# Patient Record
Sex: MALE | Race: WHITE | NOT HISPANIC OR LATINO | Employment: OTHER | URBAN - METROPOLITAN AREA
[De-identification: names, ages, dates, MRNs, and addresses within clinical notes are randomized per-mention and may not be internally consistent; named-entity substitution may affect disease eponyms.]

---

## 2017-10-20 ENCOUNTER — OFFICE VISIT (OUTPATIENT)
Dept: CARDIOLOGY | Facility: CLINIC | Age: 62
End: 2017-10-20

## 2017-10-20 VITALS
WEIGHT: 233.6 LBS | HEIGHT: 74 IN | HEART RATE: 71 BPM | DIASTOLIC BLOOD PRESSURE: 71 MMHG | SYSTOLIC BLOOD PRESSURE: 125 MMHG | BODY MASS INDEX: 29.98 KG/M2

## 2017-10-20 DIAGNOSIS — D58.0 ANEMIA DUE TO HEREDITARY SPHEROCYTOSIS (HCC): ICD-10-CM

## 2017-10-20 DIAGNOSIS — I31.39 PERICARDIAL EFFUSION: Primary | ICD-10-CM

## 2017-10-20 DIAGNOSIS — E83.118 OTHER HEMOCHROMATOSIS: ICD-10-CM

## 2017-10-20 DIAGNOSIS — I10 CHRONIC HYPERTENSION: ICD-10-CM

## 2017-10-20 PROCEDURE — 93000 ELECTROCARDIOGRAM COMPLETE: CPT | Performed by: PHYSICIAN ASSISTANT

## 2017-10-20 PROCEDURE — 99214 OFFICE O/P EST MOD 30 MIN: CPT | Performed by: INTERNAL MEDICINE

## 2017-10-20 RX ORDER — LANOLIN ALCOHOL/MO/W.PET/CERES
400 CREAM (GRAM) TOPICAL DAILY
COMMUNITY

## 2017-10-20 RX ORDER — ERGOCALCIFEROL (VITAMIN D2) 10 MCG
400 TABLET ORAL DAILY
COMMUNITY

## 2017-10-20 RX ORDER — TAMSULOSIN HYDROCHLORIDE 0.4 MG/1
1 CAPSULE ORAL NIGHTLY
COMMUNITY

## 2017-10-20 RX ORDER — MULTIPLE VITAMINS W/ MINERALS TAB 9MG-400MCG
1 TAB ORAL DAILY
COMMUNITY

## 2017-10-20 RX ORDER — ALLOPURINOL 300 MG/1
300 TABLET ORAL DAILY
COMMUNITY

## 2017-10-20 NOTE — PROGRESS NOTES
Subjective:     Encounter Date:10/20/2017      Patient ID: Kingsley Wing is a 61 y.o.  white male, semi-retired , from [place], [place].    REFERRING PHYSICIAN:  Kingsley Barksdale MD  HEMATOLOGIST:  Susan Liddle, MD     Chief Complaint: Pericardial Effusion    PROBLEM LIST:  1.  Chronic asymptomatic pericardial effusion:  a. Abnormal CT  scan of the chest demonstrating pericardial effusion, November 2011.  b. GXT Cardiolite stress test demonstrating small, mild defect in the distribution of the distal LAD or RCA suggesting ischemia, which is small and probably associated with good cardiovascular  outcome; LVEF normal, 04/11/2013.  c. CT scan of the chest, 11/04/2013, demonstrating small pericardial effusion, somewhat larger as compared to November 2011.   d. Echocardiogram, 12/12/2013, demonstrating small pericardial effusion, LVEF (0.55 -- 0.60) with moderately dilated left atrium, mild to moderate MR and mild TR.  e. Residual class I symptoms with normal EKG, 12/18/2013.  f. Persistent minimal pericardial effusion and normal left ventricular function and suggestion of possible small  PFO without intracardiac shunt, June 2014.    g. CT scan 8/17 revealing 3 cm pericardial effusion.  2. Chronic hypertension  - probably essential.    3. Chronic tobacco use, resolved December 2013.  4. Dyslipidemia.  5. Hemochromatosis (managed by Dr. Liddle).  6. Spherocytosis.  7. Osteopenia.  8. Hyperuricemia with intermittent gout.  9. Anemia caused by spherocytosis.  10.  Remote operations:  a. Cholecystectomy.  b. Lipoma removal.   c.  Cyst removal from scrotum.  11. Fall without loss of consciousness  and severe right lung injury with pneumothorax and rib fractures with closed chest tube thoracostomy; data deficit ([facility], January 2014).       Allergies   Allergen Reactions   • Metoprolol    • Valsartan Nausea Only         Current Outpatient Prescriptions:   •   "allopurinol (ZYLOPRIM) 300 MG tablet, Take 300 mg by mouth Daily., Disp: , Rfl:   •  calcium citrate-vitamin d (CITRACAL) 200-250 MG-UNIT tablet tablet, Take  by mouth Daily., Disp: , Rfl:   •  folic acid (FOLVITE) 400 MCG tablet, Take 400 mcg by mouth Daily., Disp: , Rfl:   •  Multiple Vitamins-Minerals (MULTIVITAMIN WITH MINERALS) tablet tablet, Take 1 tablet by mouth Daily., Disp: , Rfl:   •  tamsulosin (FLOMAX) 0.4 MG capsule 24 hr capsule, Take 1 capsule by mouth Every Night., Disp: , Rfl:   •  Vitamin D, Cholecalciferol, (CHOLECALCIFEROL) 400 units tablet, Take 400 Units by mouth Daily., Disp: , Rfl:     History of Present Illness  The patient is a pleasant 61-year-old gentleman who presents today for a re-evaluation after a 2-year hiatus.  He has known history of pericardial effusion initially diagnosed over 6 years ago.  He states that his physician recently reordered a CT scan of the chest as it had been some time since he had this evaluated.  The CT scan did reveal an increasing pericardial effusion and splenomegaly.  The patient states he has had no changes in symptoms with the exception of feeling \"tired\" for some time which may be related to the fact that he has not followed with his hematologist, Dr. Liddle, recently.  He has a known history of hemachromatosis and elevated bilirubin levels, as well as splenomegaly.  The patient has stopped smoking but does occasionally smoke cigars.  He denies any chest pain suggestive of angina.  He denies any worsening heart failure symptoms and denies any fluid retention in his ankles or in his abdomen.  He denies dizziness, near-syncope, or syncopal events.  He occasionally has musculoskeletal chest pain, which he states is better with activity.  Patient otherwise denies chest pain, shortness of breath, PND, edema, palpitations, syncope or presyncope at this time on limited activity.    Cardiovascular Disease Risk Factors  male gender    Social History     Social " "History   • Marital status:      Spouse name: N/A   • Number of children: N/A   • Years of education: N/A     Occupational History   • Not on file.     Social History Main Topics   • Smoking status: Current Some Day Smoker     Types: Cigars   • Smokeless tobacco: Not on file   • Alcohol use Not on file   • Drug use: Not on file   • Sexual activity: Not on file     Other Topics Concern   • Not on file     Social History Narrative       No family history on file.    ROS   Obtained and negative except as outlined in problem list and HPI.      ECG 12 Lead  Date/Time: 10/20/2017 11:06 AM  Performed by: GRAYSON MONTANO  Authorized by: GRAYSON MONTANO   Rhythm: sinus rhythm  Rate: normal  ST Segments: ST segments normal  T Waves: T waves normal  QRS axis: normal  Clinical impression: normal ECG               Objective:       Vitals:    10/20/17 1028 10/20/17 1029 10/20/17 1030 10/20/17 1031   BP: 130/67 117/63 126/66 125/71   BP Location: Left arm Left arm Right arm Right arm   Patient Position: Sitting Standing Sitting Standing   Pulse: 70 74 67 71   Weight: 233 lb 9.6 oz (106 kg)      Height: 73.5\" (186.7 cm)        Body mass index is 30.4 kg/(m^2).     Physical Exam   Constitutional: He is oriented to person, place, and time. He appears well-developed and well-nourished. No distress.   HENT:   Head: Normocephalic and atraumatic.   Eyes: Conjunctivae are normal. Pupils are equal, round, and reactive to light. Scleral icterus is present.   Neck: No JVD present. No tracheal deviation present. No thyromegaly present.   Cardiovascular: Normal rate, regular rhythm and intact distal pulses.  Exam reveals distant heart sounds. Exam reveals no gallop and no friction rub.    Murmur heard.   Medium-pitched early systolic murmur is present with a grade of 1/6  at the lower left sternal border  Pulmonary/Chest: Effort normal. No stridor. No respiratory distress. He has decreased breath sounds. He has no wheezes. He has " no rales. He exhibits no tenderness.   Abdominal: Soft. Bowel sounds are normal.   Musculoskeletal: Normal range of motion.   Lymphadenopathy:     He has no cervical adenopathy.   Neurological: He is alert and oriented to person, place, and time.   Skin: Skin is warm and dry. He is not diaphoretic.       Lab Review:  07/17/2017  · CMP - sodium 137, potassium 4.3, BUN 15, creatinine 0.74, glucose 69.  AST 33, ALT 20, alkaline phosphatase 67.  · FLP - HDL 30, triglycerides 69, and LDL 10  · CBC - White blood cell count 5, hemoglobin 9.4, hematocrit 28%, platelets 104      Assessment:   Overall continued acceptable course with no marked cardiopulmonary complaints with limited functional status. We will defer additional diagnostic or therapeutic intervention from a cardiac perspective at this time other than to encourage him to continue close followup and monitoring with Dr. Liddle and to return in 2 months for an echocardiogram.  He has cardiac clearance for treatment and removal of his right forearm mass which is suspicious for significant growth and possible tumor.       Diagnosis Plan   1. Pericardial effusion  The patient had a CT scan of August revealing 3 cm loculated pericardial effusion.  This is asymptomatic and he has a negative pulsus paradoxus.  He will have a follow-up echocardiogram in December 2017 with return visit in January 2018.  His blood pressure has remained controlled and he will continue current medical therapy.  We encouraged him to continue risk factor and lifestyle modification with discontinue all tobacco products and increase his exercise and diet with attempt at weight loss.  He is considered low cardiovascular risk for his upcoming lipoma removal.  We'll obtain records from his hematologist Dr. Liddle.  He'll return follow-up in our office in January 2018 following echocardiogram in December 2017.   2. Chronic hypertension  Continue current treatment   3. Other hemochromatosis  May need  to consider hepatology followup   4. Anemia due to hereditary spherocytosis  Per Dr. Liddle        Plan:   1. Patient to continue current medications and close follow up with the above providers.  2. Echocardiogram in December 2017.  3. Tentative cardiology follow up in January 2018, or patient may return sooner PRN.      Scribed for Vikas Rangel MD by Binu Burkett PA-C. 10/20/2017  3:51 PM    I, Vikas Rangel MD, [facility], personally performed the services described in this documentation as scribed by the above named individual in my presence, and it is both accurate and complete. At 3:30 PM on 10/20/2017

## 2017-11-20 ENCOUNTER — TRANSCRIBE ORDERS (OUTPATIENT)
Dept: PHYSICAL THERAPY | Facility: CLINIC | Age: 62
End: 2017-11-20

## 2017-11-20 DIAGNOSIS — C49.9 LIPOSARCOMA, DIFFERENTIATED (HCC): Primary | ICD-10-CM

## 2017-11-20 DIAGNOSIS — Z47.89 ORTHOPEDIC AFTERCARE: ICD-10-CM

## 2017-11-28 ENCOUNTER — TREATMENT (OUTPATIENT)
Dept: PHYSICAL THERAPY | Facility: CLINIC | Age: 62
End: 2017-11-28

## 2017-11-28 DIAGNOSIS — S61.501A: ICD-10-CM

## 2017-11-28 DIAGNOSIS — S66.911A TENDON RUPTURE OF WRIST, RIGHT, INITIAL ENCOUNTER: ICD-10-CM

## 2017-11-28 DIAGNOSIS — Z47.89 ORTHOPEDIC AFTERCARE: ICD-10-CM

## 2017-11-28 DIAGNOSIS — S66.921A: ICD-10-CM

## 2017-11-28 DIAGNOSIS — C49.9 ENDOTHELIOSARCOMA (HCC): Primary | ICD-10-CM

## 2017-11-28 PROCEDURE — 97162 PT EVAL MOD COMPLEX 30 MIN: CPT | Performed by: PHYSICAL THERAPIST

## 2017-11-28 NOTE — PROGRESS NOTES
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  During the surgery he had his EIP tendon lacerated and the EIP was attached to the EDC tendon.  He returns to MD in January 2018.  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       Patient Occupation: Retired Quality of life: good    Pain  Location: C/O right wrist stiffness, soreness, and intermittented shooting pain dorsal hand to lateral forearm.  Quality: tight, pressure and pulling  Relieving factors: rest and support  Aggravating factors: lifting, movement and repetitive movement  Progression: improved    Hand dominance: right    Treatments  No previous or current treatments           Objective       Observations     Right Elbow   Positive for adhesive scar and incision.     Right Wrist/Hand   Positive for adhesive scar and incision.     Additional Observation Details  15 cm incision scar dorsal right forearm/wrist.    Tenderness     Additional Tenderness Details  Right dorsal wrist and distal forearm tender.    Neurological Testing   Sensation     Wrist/Hand     Right   Intact: light touch, pin prick and sharp/dull discrimination    Active Range of Motion     Right Elbow   Flexion: WFL  Extension: WFL  Forearm supination: 75 degrees   Forearm pronation: 78 degrees     Right Wrist   Wrist flexion: 25 degrees   Wrist extension: 40 degrees   Radial deviation: 12 degrees   Ulnar deviation: 21 degrees     Right Digits   Flexion   Index     MCP: 50 degrees    PIP: 90 degrees    DIP: 30 degrees  Extension   Index     MCP: 0 degrees    PIP: 0 degrees    DIP: 0 degrees    Strength/Myotome Testing     Left Wrist/Hand      (2nd hand position)     Trial 1: 87 lbs    Thumb Strength  Key/Lateral Pinch     Trial 1: 23 lbs  Palmar/Three-Point Pinch     Trial 1: 15 lbs    Right Wrist/Hand    Wrist extension: 4-  Wrist flexion: 4-  Radial deviation: 4+  Ulnar deviation: 4+     (2nd hand position)     Trial 1: 54 lbs    Thumb Strength   Key/Lateral Pinch     Trial 1: 19 lbs  Palmar/Three-Point Pinch     Trial 1: 20 lbs    Tests     Additional Tests Details  QUICK DASH=36.36    General Comments     Wrist/Hand Comments  The pt is not wearing any type of splint or brace to protect and restrict motion of the Index finger.  A hand based index finger extension outrigger was constructed with 20-30 reps/hour instructed.  Pt given orders not the flex wrist and MCP joints at the same time as well.         Assessment & Plan     Assessment  Impairments: abnormal or restricted ROM, activity intolerance, impaired physical strength and pain with function  Assessment details: The pt is a 62 year old male who underwent a surgical removal of a non-malignant tumor from right distal dorsal forearm on 11/1/2017.  During the surgery he had his EIP tendon lacerated and the EIP was attached to the EDC tendon, assumed to be in zone VII or VIII (surgical repair still appears to be intact).  It should be noted due to issues with his ill wife he waited 2 weeks to start PT from the last time he saw the MD.  He as signs of an adhesive scar on the dorsum of his forearm, loss of ROM of the wrist, and weakness of the UE.  Neurological exam is normal today.  Prognosis: good  Prognosis details:   GOALS  Goals to be met in 8 weeks  1.  Pt is independent with HEP.  2.  Pt can flex his wrist 55 degrees.  3.  Pt has full index finger AROM without extension lag.  4.  Pt can independently care for his wife again.  5.  Pt can  75 lbs on the right.  6.  QUICK DASH score improves to 15 or less.  Functional Limitations: carrying objects, lifting, pulling, pushing and unable to perform repetitive tasks  Plan  Therapy options: will be seen for skilled physical therapy services  Planned modality interventions: fluidotherapy, thermotherapy  (paraffin bath), ultrasound and high voltage pulsed current (pain management)  Planned therapy interventions: fine motor coordination training, functional ROM exercises, home exercise program, joint mobilization, therapeutic activities, stretching, strengthening, soft tissue mobilization, orthotic fitting/training, neuromuscular re-education and manual therapy  Frequency: 2x week  Duration in weeks: 8              Total Treatment:     40   mins    PT SIGNATURE: Kingsley Garces PT, CHT   DATE TREATMENT INITIATED: 11/28/2017    Initial Certification  Certification Period: 2/26/2018  I certify that the therapy services are furnished while this patient is under my care.  The services outlined above are required by this patient, and will be reviewed every 90 days.     PHYSICIAN: _________________________________________________________     Jaspreet Alarcon MD PhD        DATE: _______________________________________________________________    Please sign and return via fax to  [phone]. Thank you, [facility].

## 2017-11-30 ENCOUNTER — TREATMENT (OUTPATIENT)
Dept: PHYSICAL THERAPY | Facility: CLINIC | Age: 62
End: 2017-11-30

## 2017-11-30 DIAGNOSIS — S66.911A TENDON RUPTURE OF WRIST, RIGHT, INITIAL ENCOUNTER: ICD-10-CM

## 2017-11-30 DIAGNOSIS — Z47.89 ORTHOPEDIC AFTERCARE: ICD-10-CM

## 2017-11-30 DIAGNOSIS — C49.9 ENDOTHELIOSARCOMA (HCC): Primary | ICD-10-CM

## 2017-11-30 PROCEDURE — 97110 THERAPEUTIC EXERCISES: CPT | Performed by: PHYSICAL THERAPIST

## 2017-11-30 PROCEDURE — 97140 MANUAL THERAPY 1/> REGIONS: CPT | Performed by: PHYSICAL THERAPIST

## 2017-11-30 NOTE — PROGRESS NOTES
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  Not extension lag in of IF at this time.  Limit wrist extension ROM.  See Exercise, Manual, and Modality Logs for complete treatment.       Assessment/Plan  Early into treatment, pt seem to tolerated treatment well.  Progress strengthening /stabilization /functional activity           Manual Therapy:    58     mins  16153;  Therapeutic Exercise:    15     mins  39791;     Neuromuscular Emmy:        mins  42429;    Therapeutic Activity:          mins  80021;     Gait Training:           mins  39907;     Ultrasound:          mins  72730;    Electrical Stimulation:         mins  82146 ( );  Fluidotherapy:          mins  00637  Traction:          mins  45383  Dry Needling          mins self-pay    Timed Treatment:   73   mins   Total Treatment:     73   mins    Kingsley Garces, PT  Physical Therapist

## 2017-12-06 ENCOUNTER — TREATMENT (OUTPATIENT)
Dept: PHYSICAL THERAPY | Facility: CLINIC | Age: 62
End: 2017-12-06

## 2017-12-06 DIAGNOSIS — Z47.89 ORTHOPEDIC AFTERCARE: ICD-10-CM

## 2017-12-06 DIAGNOSIS — C49.9 ENDOTHELIOSARCOMA (HCC): Primary | ICD-10-CM

## 2017-12-06 DIAGNOSIS — S66.911A TENDON RUPTURE OF WRIST, RIGHT, INITIAL ENCOUNTER: ICD-10-CM

## 2017-12-06 PROCEDURE — 97140 MANUAL THERAPY 1/> REGIONS: CPT | Performed by: PHYSICAL THERAPIST

## 2017-12-06 PROCEDURE — 97110 THERAPEUTIC EXERCISES: CPT | Performed by: PHYSICAL THERAPIST

## 2017-12-07 NOTE — PROGRESS NOTES
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     Objective   AROM: wrist extension approximately 45 degrees, wrist flexion approximately 20 degrees  INTEGUMENTARY: scar is pinkish-white in color and less hypertrophic. 4 sutures surfaced through the skin with around 0.3-0.5 cm exposed. Sutures required trimming at and below the knot, and were not able to be removed. They are holding onto an unknown surface below the skin. 2 other visible sutures are beginning to surface, but were not long enough to trim. 3-4 additional sutures at the mid-distal scar site are palpable below the skin and have not started surfacing yet.  See Exercise, Manual, and Modality Logs for complete treatment.      Assessment/Plan  Patient was instructed to perform tendon glides 4X daily, and instructed to not resume cocoa butter massage until trimmed suture sites close.   Progress per Plan of Care       Manual Therapy:    38     mins  51396;  Therapeutic Exercise:    20     mins  90143;     Neuromuscular Emmy:        mins  21315;    Therapeutic Activity:          mins  75428;     Gait Training:           mins  71148;     Ultrasound:          mins  50022;   Iontophoresis          mins  34465   Electrical Stimulation:         mins  10575 (MC );  Dry Needling          mins self-pay  Fluidotherapy          mins 52851    Timed Treatment:   58   mins   Total Treatment:     58   mins    Oriana Rodriguez, PT Student  Physical Therapist Student    Kingsley Garces, PT  Physical Therapist

## 2017-12-08 ENCOUNTER — TREATMENT (OUTPATIENT)
Dept: PHYSICAL THERAPY | Facility: CLINIC | Age: 62
End: 2017-12-08

## 2017-12-08 DIAGNOSIS — C49.9 ENDOTHELIOSARCOMA (HCC): Primary | ICD-10-CM

## 2017-12-08 DIAGNOSIS — Z47.89 ORTHOPEDIC AFTERCARE: ICD-10-CM

## 2017-12-08 DIAGNOSIS — S66.911A TENDON RUPTURE OF WRIST, RIGHT, INITIAL ENCOUNTER: ICD-10-CM

## 2017-12-08 PROCEDURE — 97110 THERAPEUTIC EXERCISES: CPT | Performed by: PHYSICAL THERAPIST

## 2017-12-08 PROCEDURE — 97140 MANUAL THERAPY 1/> REGIONS: CPT | Performed by: PHYSICAL THERAPIST

## 2017-12-08 NOTE — PROGRESS NOTES
Subjective   Kingsley Wing reports: He is feeling good today. Scar site is feeling less stiff. Sutures are not bothering him.    Objective   OBSERVATION: suture sites that were trimmed at last visit are healing well.  AROM: wrist flexion approximately 25 degrees, wrist extension approximately 57 degrees. Wrist flexion still limited, but improving.  INTEGUMENTARY: mid-distal scar site has mild-moderate elevation, and the medial side about ..75 cm from the center of the scar is pinching the skin upward toward the scar site.  PALPATION: firm, band-like structures along mi-distal scar site    See Exercise, Manual, and Modality Logs for complete treatment.       Assessment/Plan  We hypothesize there are sutures along the mid-distal scar site that still have yet to surface. [facility] is assisting with mobilizing the sutures towards the surface. Wrist AROM continues to improve.  Progress per Plan of Care           Manual Therapy:    29     mins  43902;  Therapeutic Exercise:    25     mins  45843;     Neuromuscular Emmy:        mins  28068;    Therapeutic Activity:          mins  19494;     Gait Training:           mins  50929;     Ultrasound:          mins  06657;   Iontophoresis          mins  27434   Electrical Stimulation:         mins  17246 ( );  Dry Needling          mins self-pay  Fluidotherapy          mins 39088    Timed Treatment:   54   mins   Total Treatment:     54   mins    Kingsley Garces, PT  Physical Therapist

## 2017-12-14 ENCOUNTER — TREATMENT (OUTPATIENT)
Dept: PHYSICAL THERAPY | Facility: CLINIC | Age: 62
End: 2017-12-14

## 2017-12-14 DIAGNOSIS — C49.9 ENDOTHELIOSARCOMA (HCC): ICD-10-CM

## 2017-12-14 DIAGNOSIS — Z47.89 ORTHOPEDIC AFTERCARE: Primary | ICD-10-CM

## 2017-12-14 DIAGNOSIS — S66.911S: ICD-10-CM

## 2017-12-14 PROCEDURE — 97110 THERAPEUTIC EXERCISES: CPT | Performed by: PHYSICAL THERAPIST

## 2017-12-14 PROCEDURE — 97140 MANUAL THERAPY 1/> REGIONS: CPT | Performed by: PHYSICAL THERAPIST

## 2017-12-14 NOTE — PROGRESS NOTES
Physical Therapy Daily Progress Note        Patient: Kingsley Wing   : 1955  Diagnosis/ICD-10 Code:  Orthopedic aftercare [Z47.89]  Referring practitioner: Jaspreet Alarcon*  Date of Initial Visit: Type: THERAPY  Noted: 2017  Today's Date: 2017  Patient seen for 5 sessions              Subjective   Kingsley Wing reports: His wrist is feeling less stiff.    Objective   AROM: wrist extension approximately 70 degrees, wrist flexion approximately 45 degrees.  INTEGUMENTARY: suture sites at distal scar has mild redness; throughout scar site has around 10 palpable sutures, around 4 are barely coming through the skin and the others continue to surface. Most tenderness is at distal scar site where the third to last palpable suture is very close to surfacing through the skin. No signs of infection at this time. Normal inflammatory stage of scar site considering sutures are attempting to surface.  See Exercise, Manual, and Modality Logs for complete treatment.      Assessment/Plan  Patient has shown significant improvements with wrist extension and flexion since last visit. Continue to monitor sutures and scar site. Add finger adduction exercise with yellow putty at next visit.  Progress per Plan of Care and Progress strengthening /stabilization /functional activity       Manual Therapy:    30     mins  75486;  Therapeutic Exercise:    35     mins  47579;     Neuromuscular Emmy:        mins  40468;    Therapeutic Activity:          mins  81098;     Gait Training:           mins  09548;     Ultrasound:          mins  07389;   Iontophoresis          mins  86597   Electrical Stimulation:         mins  42051 ( );  Dry Needling          mins self-pay  Fluidotherapy          mins 42872    Timed Treatment:   65   mins   Total Treatment:     65   mins    Oriana Rodriguez, PT Student  Physical Therapist Student    Kingsley Garces, PT  Physical Therapist

## 2017-12-28 ENCOUNTER — TREATMENT (OUTPATIENT)
Dept: PHYSICAL THERAPY | Facility: CLINIC | Age: 62
End: 2017-12-28

## 2017-12-28 DIAGNOSIS — Z47.89 ORTHOPEDIC AFTERCARE: Primary | ICD-10-CM

## 2017-12-28 DIAGNOSIS — S61.402S EXTENSOR TENDON LACERATION OF LEFT HAND WITH OPEN WOUND, SEQUELA: ICD-10-CM

## 2017-12-28 DIAGNOSIS — C49.9 ENDOTHELIOSARCOMA (HCC): ICD-10-CM

## 2017-12-28 DIAGNOSIS — S66.911S: ICD-10-CM

## 2017-12-28 DIAGNOSIS — S66.822S EXTENSOR TENDON LACERATION OF LEFT HAND WITH OPEN WOUND, SEQUELA: ICD-10-CM

## 2017-12-28 PROCEDURE — 97110 THERAPEUTIC EXERCISES: CPT | Performed by: PHYSICAL THERAPIST

## 2017-12-28 PROCEDURE — 97022 WHIRLPOOL THERAPY: CPT | Performed by: PHYSICAL THERAPIST

## 2017-12-28 PROCEDURE — 97140 MANUAL THERAPY 1/> REGIONS: CPT | Performed by: PHYSICAL THERAPIST

## 2017-12-31 NOTE — PROGRESS NOTES
Subjective   Kingsley Wing reports:Able to help transfer is wife without pain in the wrist or arm. Scar still gets tight at time.    Objective   OBSERVATION: 2 stitches working up thought the skin.  AROM: wrist flexion approximately 35-40 degrees, wrist extension approximately 60 degrees.  STRENGTH: No extensor lag of index finger.    See Exercise, Manual, and Modality Logs for complete treatment.       Assessment/Plan  Wrist AROM continues to improve. Strength and function improving  Progress per Plan of Care           Manual Therapy:    15     mins  82213;  Therapeutic Exercise:    25     mins  79024;     Neuromuscular Emmy:        mins  28216;    Therapeutic Activity:          mins  30599;     Gait Training:           mins  17072;     Ultrasound:          mins  34370;   Iontophoresis          mins  04940   Electrical Stimulation:         mins  23534 ( );  Dry Needling          mins self-pay  Fluidotherapy    15      mins 04332    Timed Treatment:   54   mins   Total Treatment:     54   mins    Kingsley Garces, PT  Physical Therapist

## 2018-01-04 ENCOUNTER — TREATMENT (OUTPATIENT)
Dept: PHYSICAL THERAPY | Facility: CLINIC | Age: 63
End: 2018-01-04

## 2018-01-04 DIAGNOSIS — S66.911S: ICD-10-CM

## 2018-01-04 DIAGNOSIS — S66.822S EXTENSOR TENDON LACERATION OF LEFT HAND WITH OPEN WOUND, SEQUELA: ICD-10-CM

## 2018-01-04 DIAGNOSIS — C49.9 ENDOTHELIOSARCOMA (HCC): ICD-10-CM

## 2018-01-04 DIAGNOSIS — S61.402S EXTENSOR TENDON LACERATION OF LEFT HAND WITH OPEN WOUND, SEQUELA: ICD-10-CM

## 2018-01-04 DIAGNOSIS — Z47.89 ORTHOPEDIC AFTERCARE: Primary | ICD-10-CM

## 2018-01-04 PROCEDURE — 97110 THERAPEUTIC EXERCISES: CPT | Performed by: PHYSICAL THERAPIST

## 2018-01-04 PROCEDURE — 97022 WHIRLPOOL THERAPY: CPT | Performed by: PHYSICAL THERAPIST

## 2018-01-08 NOTE — PROGRESS NOTES
Physical Therapy Daily Progress Note        Patient: Kingsley Wing   : 1955  Diagnosis/ICD-10 Code:  Orthopedic aftercare [Z47.89]  Referring practitioner: Jaspreet Alarcon*  Date of Initial Visit: Type: THERAPY  Noted: 2017  Today's Date: 2018  Patient seen for 7 sessions           Subjective   Kingsley Wing reports: No pain wrist feels good    Objective   ROM: Wrist and fingers are WNL  OBSERVATION: wounds closed   strength 85 lbs bilaterally  OTHER: Independent with HEP  See Exercise, Manual, and Modality Logs for complete treatment.       Assessment/Plan  Goals met.  No longer needs skilled PT  Other D/C from PT           Manual Therapy:         mins  06552;  Therapeutic Exercise:    45     mins  72656;     Neuromuscular Emmy:        mins  06861;    Therapeutic Activity:          mins  89066;     Gait Training:           mins  13146;     Ultrasound:          mins  03464;    Electrical Stimulation:         mins  42911 ( );  Fluidotherapy:     15     mins  65121  Traction:          mins  37666  Dry Needling          mins self-pay    Timed Treatment:   45   mins   Total Treatment:     60   mins    Kingsley Garces, PT  Physical Therapist

## 2018-01-08 NOTE — PROGRESS NOTES
Discharge Summary  Discharge Summary from Physical Therapy Report      Dates  PT visit: 11/28/2017-01/04/2018  Number of Visits: 7     Discharge Status of Patient: See Progress Note dated 01/04/2017    Goals: All Met    Discharge Plan: Continue with current home exercise program as instructed    Comments Should continue to do well with HEP.    /Date of Discharge 01/07/2018        Kingsley Garces, PT  Physical Therapist

## 2018-01-26 ENCOUNTER — OFFICE VISIT (OUTPATIENT)
Dept: CARDIOLOGY | Facility: CLINIC | Age: 63
End: 2018-01-26

## 2018-01-26 VITALS
HEIGHT: 74 IN | SYSTOLIC BLOOD PRESSURE: 136 MMHG | BODY MASS INDEX: 30.67 KG/M2 | HEART RATE: 90 BPM | DIASTOLIC BLOOD PRESSURE: 65 MMHG | WEIGHT: 239 LBS

## 2018-01-26 DIAGNOSIS — E78.5 DYSLIPIDEMIA: ICD-10-CM

## 2018-01-26 DIAGNOSIS — I31.39 PERICARDIAL EFFUSION: Primary | ICD-10-CM

## 2018-01-26 DIAGNOSIS — I10 CHRONIC HYPERTENSION: ICD-10-CM

## 2018-01-26 PROCEDURE — 99214 OFFICE O/P EST MOD 30 MIN: CPT | Performed by: INTERNAL MEDICINE

## 2018-01-26 NOTE — PROGRESS NOTES
Subjective:     Encounter Date:01/26/2018    Patient ID: Kingsley Wing is a 62 y.o.  white male, semi-retired , from [place], [place].     REFERRING PHYSICIAN:  Kingsley Barksdale MD  HEMATOLOGIST:  Susan Liddle, MD  ORTHOPEDIC SURGEON:  Jaspreet Alarcon MD ([facility])       Chief Complaint:   Chief Complaint   Patient presents with   • Pericardial Effusion     Problem List:  1.  Chronic asymptomatic pericardial effusion:  a. Abnormal CT  scan of the chest demonstrating pericardial effusion, November 2011.  b. GXT Cardiolite stress test demonstrating small, mild defect in the distribution of the distal LAD or RCA suggesting ischemia, which is small and probably associated with good cardiovascular  outcome; LVEF normal, 04/11/2013.  c. CT scan of the chest, 11/04/2013, demonstrating small pericardial effusion, somewhat larger as compared to November 2011.   d. Echocardiogram, 12/12/2013, demonstrating small pericardial effusion, LVEF (0.55 -- 0.60) with moderately dilated left atrium, mild to moderate MR and mild TR.  e. Residual class I symptoms with normal EKG, 12/18/2013.  f. Persistent minimal pericardial effusion and normal left ventricular function and suggestion of possible small  PFO without intracardiac shunt, June 2014.    g. CT scan 8/17 revealing 3 cm pericardial effusion; asymptomatic but stable hemodynamics and echocardiogram December 2017 recommended  h. Residual class I symptoms  2. Chronic hypertension  - probably essential.    3. Chronic tobacco use, resolved December 2013.  4. Dyslipidemia.  5. Hemochromatosis (managed by Dr. Liddle).  6. Spherocytosis.  7. Osteopenia.  8. Hyperuricemia with intermittent gout.  9. Anemia caused by spherocytosis.  10.  Remote operations:  a. Cholecystectomy.  b. Lipoma removal.   c.  Cyst removal from scrotum.  d. Large right forearm lipoma removal - data deficit, Dr. Alarcon, [facility]  11. Fall without loss of consciousness  and severe right  lung injury with pneumothorax and rib fractures with closed chest tube thoracostomy; data deficit ([facility], January 2014).      Allergies   Allergen Reactions   • Metoprolol    • Valsartan Nausea Only         Current Outpatient Prescriptions:   •  allopurinol (ZYLOPRIM) 300 MG tablet, Take 300 mg by mouth Daily., Disp: , Rfl:   •  calcium citrate-vitamin d (CITRACAL) 200-250 MG-UNIT tablet tablet, Take  by mouth Daily., Disp: , Rfl:   •  folic acid (FOLVITE) 400 MCG tablet, Take 400 mcg by mouth Daily., Disp: , Rfl:   •  Multiple Vitamins-Minerals (MULTIVITAMIN WITH MINERALS) tablet tablet, Take 1 tablet by mouth Daily., Disp: , Rfl:   •  tamsulosin (FLOMAX) 0.4 MG capsule 24 hr capsule, Take 1 capsule by mouth Every Night., Disp: , Rfl:   •  Vitamin D, Cholecalciferol, (CHOLECALCIFEROL) 400 units tablet, Take 400 Units by mouth Daily., Disp: , Rfl:     HISTORY OF PRESENT ILLNESS: Patient returns for scheduled 3-month followup.   He had a lipoma removed from his right forearm in November 2017.  He states that it was benign but was very involved and had chambers.  He denies any chest pain, shortness of breath, palpitations, presyncope, syncope, or edema.  He states that he has not been as active lately because his wife broke all the bones in her right foot, and he has been trying to take care of her.  She has ALS, diagnosed 7 years ago.  Patient otherwise denies chest pain, shortness of breath, PND, edema, palpitations, syncope or presyncope at this time.  He states he was very involved with his right forearm surgery and forgot about having an echocardiogram performed but is asymptomatic and active.  No constitutional complaints, specifically, the patient denies fever, chills, adenopathy, rash, pruritus, or GI/ difficulty.      Review of Systems   Constitution: Positive for weight gain.   Cardiovascular: Negative for chest pain, near-syncope, palpitations and syncope.   Respiratory:  "Negative for shortness of breath.    Endocrine: Positive for polyuria.   Skin:        Lipoma removal right forearm , benign, November 2017   Musculoskeletal: Positive for gout.   Genitourinary: Positive for frequency.      Obtained and otherwise negative except as outlined in problem list and HPI.    Procedures       Objective:       Vitals:    01/26/18 1454 01/26/18 1458   BP: 148/71 136/65   BP Location: Left arm Left arm   Patient Position: Sitting Standing   Pulse: 77 90   Weight: 108 kg (239 lb)    Height: 188 cm (74\")      Body mass index is 30.69 kg/(m^2).   Last weight:  233 lbs.    Physical Exam   Constitutional: He is oriented to person, place, and time. He appears well-developed and well-nourished.   Neck: No JVD present. Carotid bruit is not present. No thyromegaly present.   Cardiovascular: Regular rhythm, S1 normal and S2 normal.  Exam reveals distant heart sounds. Exam reveals no gallop, no S3 and no friction rub.    No murmur heard.  Pulses:       Dorsalis pedis pulses are 2+ on the right side, and 2+ on the left side.        Posterior tibial pulses are 2+ on the right side, and 2+ on the left side.   Pulmonary/Chest: Effort normal. He has decreased breath sounds. He has no wheezes. He has no rhonchi. He has no rales.   Abdominal: Soft. He exhibits no mass. There is no hepatosplenomegaly. There is no tenderness. There is no guarding.   Bowel sounds audible x4   Musculoskeletal: Normal range of motion. He exhibits no edema.   Lymphadenopathy:     He has no cervical adenopathy.   Neurological: He is alert and oriented to person, place, and time.   Skin: Skin is warm, dry and intact. No rash noted.   Vitals reviewed.        Lab Review: No recent laboratory results available for review.    Lab Results   Component Value Date    CREATININE 0.8 08/31/2015           Assessment:   Overall continued acceptable course with no interim cardiopulmonary complaints with acceptable functional status.  He was supposed " to have an echocardiogram in December 2017, but with his lipoma surgery, he forgot to have this performed.  We will order an echocardiogram to be done on the same day as his followup appointment in May 2018.       Diagnosis Plan   1. Pericardial effusion  Adult Transthoracic Echo Complete W/ Cont if Necessary Per Protocol   2. Chronic hypertension  Controlled   3. Dyslipidemia  No new labs          Plan:         1. Patient to continue current medications and close follow up with the above providers.  2. Tentative cardiology follow up in May 2018 with same-day echocardiogram, or patient may return sooner PRN.      Scribed for Vikas Rangel MD by Elis Red, APRN. 1/26/2018  3:30 PM    I, Vikas Rangel MD, [facility], personally performed the services described in this documentation as scribed by the above named individual in my presence, and it is both accurate and complete. At 3:36 PM on 01/26/2018

## 2018-05-21 ENCOUNTER — HOSPITAL ENCOUNTER (OUTPATIENT)
Dept: CARDIOLOGY | Facility: HOSPITAL | Age: 63
Discharge: HOME | End: 2018-05-21
Attending: INTERNAL MEDICINE

## 2018-05-21 DIAGNOSIS — I31.39 PERICARDIAL EFFUSION: ICD-10-CM

## 2018-05-21 LAB
BH CV ECHO MEAS - AO MAX PG (FULL): 3.2 MMHG
BH CV ECHO MEAS - AO MAX PG: 10 MMHG
BH CV ECHO MEAS - AO MEAN PG (FULL): 1.3 MMHG
BH CV ECHO MEAS - AO MEAN PG: 5.9 MMHG
BH CV ECHO MEAS - AO ROOT AREA (BSA CORRECTED): 1.5
BH CV ECHO MEAS - AO ROOT AREA: 9.3 CM^2
BH CV ECHO MEAS - AO ROOT DIAM: 3.4 CM
BH CV ECHO MEAS - AO V2 MAX: 156.1 CM/SEC
BH CV ECHO MEAS - AO V2 MEAN: 114.6 CM/SEC
BH CV ECHO MEAS - AO V2 VTI: 37.1 CM
BH CV ECHO MEAS - BSA(HAYCOCK): 2.4 M^2
BH CV ECHO MEAS - BSA: 2.3 M^2
BH CV ECHO MEAS - BZI_BMI: 30.7 KILOGRAMS/M^2
BH CV ECHO MEAS - BZI_METRIC_HEIGHT: 188 CM
BH CV ECHO MEAS - BZI_METRIC_WEIGHT: 108.4 KG
BH CV ECHO MEAS - CONTRAST EF (2CH): 72.5 ML/M^2
BH CV ECHO MEAS - CONTRAST EF 4CH: 84.4 ML/M^2
BH CV ECHO MEAS - EDV(CUBED): 161 ML
BH CV ECHO MEAS - EDV(MOD-SP2): 69 ML
BH CV ECHO MEAS - EDV(MOD-SP4): 96 ML
BH CV ECHO MEAS - EDV(TEICH): 143.7 ML
BH CV ECHO MEAS - EF(CUBED): 85.8 %
BH CV ECHO MEAS - EF(MOD-BP): 72 %
BH CV ECHO MEAS - EF(MOD-SP2): 72.5 %
BH CV ECHO MEAS - EF(MOD-SP4): 84.4 %
BH CV ECHO MEAS - EF(TEICH): 78.7 %
BH CV ECHO MEAS - ESV(CUBED): 22.9 ML
BH CV ECHO MEAS - ESV(MOD-SP2): 19 ML
BH CV ECHO MEAS - ESV(MOD-SP4): 15 ML
BH CV ECHO MEAS - ESV(TEICH): 30.6 ML
BH CV ECHO MEAS - FS: 47.8 %
BH CV ECHO MEAS - IVS/LVPW: 0.74
BH CV ECHO MEAS - IVSD: 1 CM
BH CV ECHO MEAS - LA DIMENSION: 3.9 CM
BH CV ECHO MEAS - LA/AO: 1.1
BH CV ECHO MEAS - LAT PEAK E' VEL: 6.9 CM/SEC
BH CV ECHO MEAS - LV DIASTOLIC VOL/BSA (35-75): 41 ML/M^2
BH CV ECHO MEAS - LV MASS(C)D: 183.7 GRAMS
BH CV ECHO MEAS - LV MASS(C)DI: 78.4 GRAMS/M^2
BH CV ECHO MEAS - LV MAX PG: 6.8 MMHG
BH CV ECHO MEAS - LV MEAN PG: 4.5 MMHG
BH CV ECHO MEAS - LV SYSTOLIC VOL/BSA (12-30): 6.4 ML/M^2
BH CV ECHO MEAS - LV V1 MAX: 130.8 CM/SEC
BH CV ECHO MEAS - LV V1 MEAN: 100.9 CM/SEC
BH CV ECHO MEAS - LV V1 VTI: 31.3 CM
BH CV ECHO MEAS - LVIDD: 5.4 CM
BH CV ECHO MEAS - LVIDS: 3.2 CM
BH CV ECHO MEAS - LVLD AP2: 7.3 CM
BH CV ECHO MEAS - LVLD AP4: 7.4 CM
BH CV ECHO MEAS - LVLS AP2: 6.4 CM
BH CV ECHO MEAS - LVLS AP4: 5.7 CM
BH CV ECHO MEAS - LVPWD: 1 CM
BH CV ECHO MEAS - MED PEAK E' VEL: 7.24 CM/SEC
BH CV ECHO MEAS - MV A MAX VEL: 82.9 CM/SEC
BH CV ECHO MEAS - MV DEC SLOPE: 453.4 CM/SEC^2
BH CV ECHO MEAS - MV DEC TIME: 0.23 SEC
BH CV ECHO MEAS - MV E MAX VEL: 90.3 CM/SEC
BH CV ECHO MEAS - MV E/A: 1.1
BH CV ECHO MEAS - MV P1/2T MAX VEL: 131.9 CM/SEC
BH CV ECHO MEAS - MV P1/2T: 85.2 MSEC
BH CV ECHO MEAS - MVA P1/2T LCG: 1.7 CM^2
BH CV ECHO MEAS - MVA(P1/2T): 2.6 CM^2
BH CV ECHO MEAS - PA ACC SLOPE: 408.4 CM/SEC^2
BH CV ECHO MEAS - PA ACC TIME: 0.16 SEC
BH CV ECHO MEAS - PA MAX PG (FULL): 4.3 MMHG
BH CV ECHO MEAS - PA MAX PG: 7.1 MMHG
BH CV ECHO MEAS - PA PR(ACCEL): 6.1 MMHG
BH CV ECHO MEAS - PA V2 MAX: 133 CM/SEC
BH CV ECHO MEAS - RAP SYSTOLE: 35 MMHG
BH CV ECHO MEAS - RV MAX PG: 2.8 MMHG
BH CV ECHO MEAS - RV V1 MAX: 83.4 CM/SEC
BH CV ECHO MEAS - RVSP: 22 MMHG
BH CV ECHO MEAS - SI(AO): 147.9 ML/M^2
BH CV ECHO MEAS - SI(CUBED): 58.9 ML/M^2
BH CV ECHO MEAS - SI(MOD-SP2): 21.3 ML/M^2
BH CV ECHO MEAS - SI(MOD-SP4): 34.6 ML/M^2
BH CV ECHO MEAS - SI(TEICH): 48.3 ML/M^2
BH CV ECHO MEAS - SV(AO): 346.7 ML
BH CV ECHO MEAS - SV(CUBED): 138.1 ML
BH CV ECHO MEAS - SV(MOD-SP2): 50 ML
BH CV ECHO MEAS - SV(MOD-SP4): 81 ML
BH CV ECHO MEAS - SV(TEICH): 113.1 ML
BH CV ECHO MEAS - TAPSE (>1.6): 2.4 CM2
BH CV ECHO MEAS - TR MAX V: 32 MMHG
BH CV ECHO MEAS - TR MAX VEL: 282 CM/SEC
BH CV ECHO MEASUREMENTS AVERAGE E/E' RATIO: 12.77
BH CV XLRA - RV BASE: 5.3 CM
BH CV XLRA - RV LENGTH: 6.2 CM
BH CV XLRA - RV MID: 3.4 CM
BH CV XLRA - TDI S': 12.6 CM/SEC
LV EF 2D ECHO EST: 70 %

## 2018-05-21 PROCEDURE — 93306 TTE W/DOPPLER COMPLETE: CPT

## 2018-05-21 PROCEDURE — 93306 TTE W/DOPPLER COMPLETE: CPT | Performed by: INTERNAL MEDICINE

## 2018-05-30 ENCOUNTER — OFFICE VISIT (OUTPATIENT)
Dept: CARDIOLOGY | Facility: CLINIC | Age: 63
End: 2018-05-30

## 2018-05-30 VITALS
BODY MASS INDEX: 31.54 KG/M2 | WEIGHT: 238 LBS | DIASTOLIC BLOOD PRESSURE: 59 MMHG | SYSTOLIC BLOOD PRESSURE: 128 MMHG | HEIGHT: 73 IN | HEART RATE: 65 BPM

## 2018-05-30 DIAGNOSIS — E78.5 DYSLIPIDEMIA: ICD-10-CM

## 2018-05-30 DIAGNOSIS — E83.118 OTHER HEMOCHROMATOSIS: ICD-10-CM

## 2018-05-30 DIAGNOSIS — I10 CHRONIC HYPERTENSION: Primary | ICD-10-CM

## 2018-05-30 DIAGNOSIS — I31.39 PERICARDIAL EFFUSION: ICD-10-CM

## 2018-05-30 PROCEDURE — 99214 OFFICE O/P EST MOD 30 MIN: CPT | Performed by: INTERNAL MEDICINE

## 2018-05-30 RX ORDER — ANASTROZOLE 1 MG/1
TABLET ORAL
COMMUNITY

## 2019-02-06 ENCOUNTER — OFFICE VISIT (OUTPATIENT)
Dept: CARDIOLOGY | Facility: CLINIC | Age: 64
End: 2019-02-06

## 2019-02-06 VITALS
HEART RATE: 81 BPM | DIASTOLIC BLOOD PRESSURE: 65 MMHG | WEIGHT: 236.4 LBS | SYSTOLIC BLOOD PRESSURE: 110 MMHG | HEIGHT: 74 IN | BODY MASS INDEX: 30.34 KG/M2

## 2019-02-06 DIAGNOSIS — E79.0 HYPERURICEMIA: ICD-10-CM

## 2019-02-06 DIAGNOSIS — I10 CHRONIC HYPERTENSION: ICD-10-CM

## 2019-02-06 DIAGNOSIS — I31.39 PERICARDIAL EFFUSION: Primary | ICD-10-CM

## 2019-02-06 DIAGNOSIS — E78.5 DYSLIPIDEMIA: ICD-10-CM

## 2019-02-06 DIAGNOSIS — E83.118 OTHER HEMOCHROMATOSIS: ICD-10-CM

## 2019-02-06 PROCEDURE — 99214 OFFICE O/P EST MOD 30 MIN: CPT | Performed by: INTERNAL MEDICINE

## 2019-02-06 NOTE — PROGRESS NOTES
Subjective:     Encounter Date:02/06/2019    Patient ID: Kingsley Wing is a 63 y.o.  white male, semi-retired , from [place], [place].     REFERRING PHYSICIAN:  Kingsley Barksdale MD  HEMATOLOGIST:  Susan Liddle, MD  ORTHOPEDIC SURGEON:  Jaspreet Alarcon MD ([facility])  [facility] ONCOLOGIST:  Unknown (Dr. Gilbert?)    Chief Complaint:   Chief Complaint   Patient presents with   • Chronic hypertension   • Pericardial Effusion     Problem List:  1. Chronic asymptomatic pericardial effusion:  a. Abnormal CT  scan of the chest demonstrating pericardial effusion, November 2011.  b. GXT Cardiolite stress test demonstrating small, mild defect in the distribution of the distal LAD or RCA suggesting ischemia, which is small and probably associated with good cardiovascular  outcome; LVEF normal, 04/11/2013.  c. CT scan of the chest, 11/04/2013, demonstrating small pericardial effusion, somewhat larger as compared to November 2011.   d. Echocardiogram, 12/12/2013, demonstrating small pericardial effusion, LVEF (0.55 -- 0.60) with moderately dilated left atrium, mild to moderate MR and mild TR.  e. Residual class I symptoms with normal EKG, 12/18/2013.  f. Persistent minimal pericardial effusion and normal left ventricular function and suggestion of possible small PFO without intracardiac shunt, June 2014.    g. CT scan, August 2017, revealing 3 cm pericardial effusion; asymptomatic but stable hemodynamics and echocardiogram December 2017 recommended  h. Abnormal echocardiogram demonstrating preserved systolic function (LVEF 0.70) with mild TR and moderate circumferential PE with normal RV and no marked valve abnormality, May 2018, with residual class I symptoms on generally unrestricted activity.  2. Chronic hypertension  - probably essential.    3. Chronic tobacco use, resolved December 2013; continues to smoke 2-3 cigars weekly, February 2019  4. Dyslipidemia.  5. Hemochromatosis (managed by   "Liddle).  6. Spherocytosis.  7. Osteopenia.  8. Hyperuricemia with intermittent gout.  9. Anemia caused by spherocytosis.  10.  Remote operations:  a. Cholecystectomy.  b. Lipoma removal.   c. Cyst removal from scrotum.  d. Large right forearm lipoma removal - data deficit, Dr. Alarcon, [facility]  11. Fall without loss of consciousness  and severe right lung injury with pneumothorax and rib fractures with closed chest tube thoracostomy; data deficit ([facility], January 2014).         Allergies   Allergen Reactions   • Metoprolol    • Valsartan Nausea Only         Current Outpatient Medications:   •  allopurinol (ZYLOPRIM) 300 MG tablet, Take 300 mg by mouth Daily., Disp: , Rfl:   •  anastrozole (ARIMIDEX) 1 MG tablet, Take  by mouth. 3 times a week , Disp: , Rfl:   •  calcium citrate-vitamin d (CITRACAL) 200-250 MG-UNIT tablet tablet, Take  by mouth Daily., Disp: , Rfl:   •  folic acid (FOLVITE) 400 MCG tablet, Take 400 mcg by mouth Daily., Disp: , Rfl:   •  Multiple Vitamins-Minerals (MULTIVITAMIN WITH MINERALS) tablet tablet, Take 1 tablet by mouth Daily., Disp: , Rfl:   •  tamsulosin (FLOMAX) 0.4 MG capsule 24 hr capsule, Take 1 capsule by mouth Every Night., Disp: , Rfl:   •  Vitamin D, Cholecalciferol, (CHOLECALCIFEROL) 400 units tablet, Take 400 Units by mouth Daily., Disp: , Rfl:     HISTORY OF PRESENT ILLNESS: Patient returns for scheduled 8-1/2 month followup. He had followup at [facility] and \"everything seemed the same.\"  He is on Arimidex for decreased testosterone, prescribed by Dr. Barksdale.  He has a  who handles things for him because he wants to spend his time with his wife who has ALS.  They like to take their [id number] 4-duarte out and ride through woods when the weather is nice.  He has no problems with smothering when he lies down in bed to sleep.  He states that he has \"dramatically cut down on the cigars.\"  He only smokes a cigar about 2-3 times a week, and " "he continues to drink beer, and \"I do like a good bourbon.\"  He knows that he needs to increase his daily activity.  He does not get a flu shot because the only time he got one about 10 years ago was the only time he got the flu.  The importance of influenza immunization is discussed with him.  He is assured that you cannot get the flu from the flu vaccine.  Patient otherwise denies chest pain, shortness of breath, PND, edema, palpitations, syncope or presyncope at this time.        Review of Systems   Skin: Positive for dry skin (hands, in winter).   Musculoskeletal: Positive for gout.      Obtained and otherwise negative except as outlined in problem list and HPI.    Procedures       Objective:       Vitals:    02/06/19 1023 02/06/19 1025   BP: 115/67 110/65   BP Location: Left arm Left arm   Patient Position: Sitting Standing   Pulse: 72 81   Weight: 107 kg (236 lb 6.4 oz)    Height: 188 cm (74\")      Body mass index is 30.35 kg/m².   Last weight:  238 lbs.    Physical Exam   Constitutional: He is oriented to person, place, and time. He appears well-developed and well-nourished.   Neck: No JVD present. Carotid bruit is not present. No thyromegaly present.   Cardiovascular: Regular rhythm, S1 normal and S2 normal. Exam reveals distant heart sounds. Exam reveals no gallop, no S3 and no friction rub.   No murmur heard.  Pulses:       Carotid pulses are 1+ on the right side, and 1+ on the left side.       Radial pulses are 1+ on the right side, and 1+ on the left side.        Femoral pulses are 1+ on the right side, and 1+ on the left side.       Popliteal pulses are 1+ on the right side, and 1+ on the left side.        Dorsalis pedis pulses are 1+ on the right side, and 1+ on the left side.        Posterior tibial pulses are 1+ on the right side, and 1+ on the left side.   Pulmonary/Chest: Effort normal. He has decreased breath sounds. He has no wheezes. He has no rhonchi. He has no rales.   Abdominal: Soft. He " exhibits no mass. There is no hepatosplenomegaly. There is no tenderness. There is no guarding.   Bowel sounds audible x4   Musculoskeletal: Normal range of motion. He exhibits no edema.   Lymphadenopathy:     He has no cervical adenopathy.   Neurological: He is alert and oriented to person, place, and time.   Skin: Skin is warm, dry and intact. No rash noted.   Vitals reviewed.        Lab Review: No recent laboratory studies available for review    Lab Results   Component Value Date    CREATININE 0.8 08/31/2015           Assessment:   Overall continued acceptable course with no interim cardiopulmonary complaints with good functional status. We will defer additional diagnostic or therapeutic intervention from a cardiac perspective at this time.       Diagnosis Plan   1. Pericardial effusion  Adult Transthoracic Echo Complete W/ Cont if Necessary Per Protocol   2. Chronic hypertension  Acceptable blood pressure readings; No recurrent angina pectoris or CHF on current activity schedule; continue current treatment     3. Other hemochromatosis  No data to review   4. Dyslipidemia  No data to review   5. Hyperuricemia  No data to review          Plan:         1. Patient to continue current medications and close follow up with the above providers.  2. Tentative cardiology follow up in September or October 2019 with same-day echocardiogram, or patient may return sooner PRN.    Transcribed by Ilana Bustos for Dr. Vikas Rangel at 10:33 AM on 02/06/2019    I, Vikas Rangel MD, [facility], personally performed the services described in this documentation as scribed by the above named individual in my presence, and it is both accurate and complete. At 11:16 AM on 02/06/2019

## 2019-09-11 ENCOUNTER — HOSPITAL ENCOUNTER (OUTPATIENT)
Dept: CARDIOLOGY | Facility: HOSPITAL | Age: 64
Discharge: HOME | End: 2019-09-11
Admitting: INTERNAL MEDICINE

## 2019-09-11 ENCOUNTER — OFFICE VISIT (OUTPATIENT)
Dept: CARDIOLOGY | Facility: CLINIC | Age: 64
End: 2019-09-11

## 2019-09-11 VITALS
SYSTOLIC BLOOD PRESSURE: 121 MMHG | WEIGHT: 237.2 LBS | HEART RATE: 70 BPM | BODY MASS INDEX: 30.44 KG/M2 | HEIGHT: 74 IN | DIASTOLIC BLOOD PRESSURE: 72 MMHG

## 2019-09-11 VITALS — BODY MASS INDEX: 30.29 KG/M2 | HEIGHT: 74 IN | WEIGHT: 236 LBS

## 2019-09-11 DIAGNOSIS — I10 CHRONIC HYPERTENSION: ICD-10-CM

## 2019-09-11 DIAGNOSIS — E78.5 DYSLIPIDEMIA: ICD-10-CM

## 2019-09-11 DIAGNOSIS — I31.39 PERICARDIAL EFFUSION: Primary | ICD-10-CM

## 2019-09-11 DIAGNOSIS — I31.39 PERICARDIAL EFFUSION: ICD-10-CM

## 2019-09-11 LAB
ASCENDING AORTA: 3.8 CM
BH CV ECHO MEAS - AO ROOT AREA (BSA CORRECTED): 1.5
BH CV ECHO MEAS - AO ROOT AREA: 9.1 CM^2
BH CV ECHO MEAS - AO ROOT DIAM: 3.4 CM
BH CV ECHO MEAS - ASC AORTA: 3.8 CM
BH CV ECHO MEAS - BSA(HAYCOCK): 2.4 M^2
BH CV ECHO MEAS - BSA: 2.3 M^2
BH CV ECHO MEAS - BZI_BMI: 30.3 KILOGRAMS/M^2
BH CV ECHO MEAS - BZI_METRIC_HEIGHT: 188 CM
BH CV ECHO MEAS - BZI_METRIC_WEIGHT: 107.1 KG
BH CV ECHO MEAS - EDV(CUBED): 115.9 ML
BH CV ECHO MEAS - EDV(MOD-SP2): 179 ML
BH CV ECHO MEAS - EDV(MOD-SP4): 161 ML
BH CV ECHO MEAS - EDV(TEICH): 111.5 ML
BH CV ECHO MEAS - EF(CUBED): 81.7 %
BH CV ECHO MEAS - EF(MOD-BP): 65 %
BH CV ECHO MEAS - EF(MOD-SP2): 70.9 %
BH CV ECHO MEAS - EF(MOD-SP4): 59 %
BH CV ECHO MEAS - EF(TEICH): 74.3 %
BH CV ECHO MEAS - ESV(CUBED): 21.2 ML
BH CV ECHO MEAS - ESV(MOD-SP2): 52 ML
BH CV ECHO MEAS - ESV(MOD-SP4): 66 ML
BH CV ECHO MEAS - ESV(TEICH): 28.7 ML
BH CV ECHO MEAS - FS: 43.2 %
BH CV ECHO MEAS - IVS/LVPW: 0.87
BH CV ECHO MEAS - IVSD: 1.4 CM
BH CV ECHO MEAS - LAD MAJOR: 7 CM
BH CV ECHO MEAS - LAT PEAK E' VEL: 7.8 CM/SEC
BH CV ECHO MEAS - LATERAL E/E' RATIO: 14.3
BH CV ECHO MEAS - LV DIASTOLIC VOL/BSA (35-75): 69.1 ML/M^2
BH CV ECHO MEAS - LV IVRT: 0.05 SEC
BH CV ECHO MEAS - LV MASS(C)D: 253.3 GRAMS
BH CV ECHO MEAS - LV MASS(C)DI: 108.7 GRAMS/M^2
BH CV ECHO MEAS - LV SYSTOLIC VOL/BSA (12-30): 28.3 ML/M^2
BH CV ECHO MEAS - LVIDD: 4.9 CM
BH CV ECHO MEAS - LVIDS: 2.8 CM
BH CV ECHO MEAS - LVLD AP2: 8.9 CM
BH CV ECHO MEAS - LVLD AP4: 8.4 CM
BH CV ECHO MEAS - LVLS AP2: 7.1 CM
BH CV ECHO MEAS - LVLS AP4: 6.4 CM
BH CV ECHO MEAS - LVOT AREA (M): 3.8 CM^2
BH CV ECHO MEAS - LVOT AREA: 3.7 CM^2
BH CV ECHO MEAS - LVOT DIAM: 2.2 CM
BH CV ECHO MEAS - LVPWD: 1.4 CM
BH CV ECHO MEAS - MED PEAK E' VEL: 6.7 CM/SEC
BH CV ECHO MEAS - MEDIAL E/E' RATIO: 16.6
BH CV ECHO MEAS - MPA AREA: 13.1 CM^2
BH CV ECHO MEAS - MPA DIAM: 4.1 CM
BH CV ECHO MEAS - MV A MAX VEL: 80.8 CM/SEC
BH CV ECHO MEAS - MV DEC TIME: 0.15 SEC
BH CV ECHO MEAS - MV E MAX VEL: 113.5 CM/SEC
BH CV ECHO MEAS - MV E/A: 1.4
BH CV ECHO MEAS - PA ACC SLOPE: 985.8 CM/SEC^2
BH CV ECHO MEAS - PA ACC TIME: 0.11 SEC
BH CV ECHO MEAS - PA PR(ACCEL): 29.9 MMHG
BH CV ECHO MEAS - PI END-D VEL: 87.5 CM/SEC
BH CV ECHO MEAS - PULM A REVS VEL: 28.1 CM/SEC
BH CV ECHO MEAS - PULM DIAS VEL: 48.1 CM/SEC
BH CV ECHO MEAS - PULM S/D: 1.6
BH CV ECHO MEAS - PULM SYS VEL: 77.1 CM/SEC
BH CV ECHO MEAS - RAP SYSTOLE: 3 MMHG
BH CV ECHO MEAS - RVSP: 30 MMHG
BH CV ECHO MEAS - SI(CUBED): 40.6 ML/M^2
BH CV ECHO MEAS - SI(MOD-SP2): 54.5 ML/M^2
BH CV ECHO MEAS - SI(MOD-SP4): 40.7 ML/M^2
BH CV ECHO MEAS - SI(TEICH): 35.5 ML/M^2
BH CV ECHO MEAS - SV(CUBED): 94.7 ML
BH CV ECHO MEAS - SV(MOD-SP2): 127 ML
BH CV ECHO MEAS - SV(MOD-SP4): 95 ML
BH CV ECHO MEAS - SV(TEICH): 82.8 ML
BH CV ECHO MEAS - TAPSE (>1.6): 2.5 CM2
BH CV ECHO MEAS - TR MAX PG: 27 MMHG
BH CV ECHO MEAS - TR MAX VEL: 259 CM/SEC
BH CV ECHO MEASUREMENTS AVERAGE E/E' RATIO: 15.66
BH CV VAS BP LEFT ARM: NORMAL MMHG
BH CV XLRA - RV BASE: 5.3 CM
BH CV XLRA - RV LENGTH: 8.5 CM
BH CV XLRA - RV MID: 3.9 CM
BH CV XLRA - TDI S': 13.4 CM/SEC
LEFT ATRIUM VOLUME INDEX: 52.8 ML/M^2
LEFT ATRIUM VOLUME: 123 ML
LV EF 2D ECHO EST: 68 %
MAXIMAL PREDICTED HEART RATE: 158 BPM
STRESS TARGET HR: 134 BPM

## 2019-09-11 PROCEDURE — 99214 OFFICE O/P EST MOD 30 MIN: CPT | Performed by: INTERNAL MEDICINE

## 2019-09-11 PROCEDURE — 93306 TTE W/DOPPLER COMPLETE: CPT

## 2019-09-11 PROCEDURE — 93306 TTE W/DOPPLER COMPLETE: CPT | Performed by: INTERNAL MEDICINE
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
## 2019-11-17 NOTE — PROGRESS NOTES
Subjective:     Encounter Date:05/30/2018    Patient ID: Kingsley Wing is a 62 y.o.  white male, semi-retired , from [place], [place].     REFERRING PHYSICIAN:  Kingsley Barksdale MD  HEMATOLOGIST:  Susan Liddle, MD  ORTHOPEDIC SURGEON:  Jaspreet Alarcon MD ([facility])  [facility] ONCOLOGIST:  Unknown    Chief Complaint:   Chief Complaint   Patient presents with   • Pericardial Effusion     Problem List:  1.  Chronic asymptomatic pericardial effusion:  a. Abnormal CT  scan of the chest demonstrating pericardial effusion, November 2011.  b. GXT Cardiolite stress test demonstrating small, mild defect in the distribution of the distal LAD or RCA suggesting ischemia, which is small and probably associated with good cardiovascular  outcome; LVEF normal, 04/11/2013.  c. CT scan of the chest, 11/04/2013, demonstrating small pericardial effusion, somewhat larger as compared to November 2011.   d. Echocardiogram, 12/12/2013, demonstrating small pericardial effusion, LVEF (0.55 -- 0.60) with moderately dilated left atrium, mild to moderate MR and mild TR.  e. Residual class I symptoms with normal EKG, 12/18/2013.  f. Persistent minimal pericardial effusion and normal left ventricular function and suggestion of possible small  PFO without intracardiac shunt, June 2014.    g. CT scan, August 2017, revealing 3 cm pericardial effusion; asymptomatic but stable hemodynamics and echocardiogram December 2017 recommended  h. Abnormal echocardiogram demonstrating preserved systolic function (LVEF 0.70) with mild TR and moderate circumferential PE with normal RV and no marked valve abnormality, May 2018, with residual class I symptoms on generally unrestricted activity.  2. Chronic hypertension  - probably essential.    3. Chronic tobacco use, resolved December 2013.  4. Dyslipidemia.  5. Hemochromatosis (managed by Dr. Liddle).  6. Spherocytosis.  7. Osteopenia.  8. Hyperuricemia with intermittent gout.  9. Anemia  caused by spherocytosis.  10.  Remote operations:  a. Cholecystectomy.  b. Lipoma removal.   c. Cyst removal from scrotum.  d. Large right forearm lipoma removal - data deficit, Dr. Alarcon, [facility]  11. Fall without loss of consciousness  and severe right lung injury with pneumothorax and rib fractures with closed chest tube thoracostomy; data deficit ([facility], January 2014).       Allergies   Allergen Reactions   • Metoprolol    • Valsartan Nausea Only         Current Outpatient Prescriptions:   •  allopurinol (ZYLOPRIM) 300 MG tablet, Take 300 mg by mouth Daily., Disp: , Rfl:   •  anastrozole (ARIMIDEX) 1 MG tablet, Take  by mouth 1 (One) Time Per Week. 3 times a week, Disp: , Rfl:   •  calcium citrate-vitamin d (CITRACAL) 200-250 MG-UNIT tablet tablet, Take  by mouth Daily., Disp: , Rfl:   •  folic acid (FOLVITE) 400 MCG tablet, Take 400 mcg by mouth Daily., Disp: , Rfl:   •  Multiple Vitamins-Minerals (MULTIVITAMIN WITH MINERALS) tablet tablet, Take 1 tablet by mouth Daily., Disp: , Rfl:   •  tamsulosin (FLOMAX) 0.4 MG capsule 24 hr capsule, Take 1 capsule by mouth Every Night., Disp: , Rfl:   •  Vitamin D, Cholecalciferol, (CHOLECALCIFEROL) 400 units tablet, Take 400 Units by mouth Daily., Disp: , Rfl:     HISTORY OF PRESENT ILLNESS: Patient returns for scheduled 4-month followup. His abnormal echocardiogram is discussed with him, and the patient says he does not feel differently on a day-to-day basis.  He is told that his pericardial effusion is not conducive to pericardiocentesis, and since he is asymptomatic, we will defer this at this time.  He says that he is prepared to give up smoking cigars completely and be more active, and he wonders if that would help.  He is told that both of those things would be good to do, but we cannot say that it would fix the problem with his pericardium.  The patient admits that he has been sedentary for the past several years and questions  "whether there is any reason to avoid exercise; he is encouraged to be active, that there is no reason not to be.  He is told that the problem is not with his heart but with the lining around his heart.  He has been experiencing a cough with sputum production over the last week or so, but this is better today.  He has also experienced some neuropathy.  He is accompanied to the office today by his wife, who is in a wheelchair.  The patient has had lab work drawn by Dr. Barksdale; data deficit.  He is scheduled to see an oncologist at [facility] (Dr. Gilbert?) in June 2018.  Patient otherwise denies chest pain, shortness of breath, PND, edema, palpitations, syncope or presyncope at this time.        Review of Systems   Respiratory: Positive for cough and sputum production.       Obtained and otherwise negative except as outlined in problem list and HPI.    Procedures       Objective:       Vitals:    05/30/18 1345 05/30/18 1349   BP: 125/64 128/59   BP Location: Left arm Left arm   Patient Position: Standing Sitting   Pulse: 79 65   Weight: 108 kg (238 lb) 108 kg (238 lb)   Height: 185.4 cm (73\") 185.4 cm (73\")     Body mass index is 31.4 kg/m².   Last weight:  239 lbs.    Physical Exam   Constitutional: He is oriented to person, place, and time. He appears well-developed and well-nourished.   Neck: No JVD present. Carotid bruit is not present. No thyromegaly present.   Cardiovascular: Regular rhythm, S1 normal, S2 normal and normal heart sounds.  Exam reveals no gallop, no S3 and no friction rub.    No murmur heard.  Pulses:       Dorsalis pedis pulses are 2+ on the right side, and 2+ on the left side.        Posterior tibial pulses are 2+ on the right side, and 2+ on the left side.   Pulmonary/Chest: Effort normal and breath sounds normal. He has no wheezes. He has no rhonchi. He has no rales.   Abdominal: Soft. He exhibits no mass. There is no hepatosplenomegaly. There is no tenderness. There is no guarding.   Bowel sounds " audible x4   Musculoskeletal: Normal range of motion. He exhibits no edema.   Lymphadenopathy:     He has no cervical adenopathy.   Neurological: He is alert and oriented to person, place, and time.   Skin: Skin is warm, dry and intact. No rash noted.   Vitals reviewed.        Lab Review:   Lab Results   Component Value Date    CREATININE 0.8 08/31/2015     Echocardiogram, 05/21/2018:  · Left ventricular systolic function is normal. Estimated EF = 70%.  · Mild tricuspid valve regurgitation is present.  · Calculated right ventricular systolic pressure from tricuspid regurgitation is 22 mmHg.  · There is a moderate (1-2cm) circumferential pericardial effusion.  · The presence of early cardiac tamponade cannot be excluded.  · Normal right ventricular cavity size, wall thickness, systolic function and septal motion noted.  · No evidence of pulmonary hypertension is present.  · Left ventricular diastolic function is normal.  · No definitive structural valvular abnormality demonstrated.        Assessment:   Overall continued acceptable course with no interim cardiopulmonary complaints with good functional status. We will defer additional diagnostic or therapeutic intervention from a cardiac perspective at this time.  Hopefully, we will be allowed to review his recent laboratory results with him by letter.  I do not feel at this time that he needs to pursue a pericardial window.       Diagnosis Plan   1. Chronic hypertension  Acceptable control   2. Pericardial effusion  Will continue close observation; the patient will notify us if he has interim complaints of exertional dyspnea or chest discomfort.   3. Other hemochromatosis  No data to review   4. Dyslipidemia  No data to review          Plan:         1. Patient to continue current medications and close follow up with the above providers.  2. Tentative cardiology follow up in early January 2019, or patient may return sooner PRN.       Transcribed by Ilana Bustos  for Dr. Vikas Rangel at 1:56 PM on 05/30/2018       I, Vikas Rangel MD, FACC, personally performed the services described in this documentation as scribed by the above named individual in my presence, and it is both accurate and complete. At 3:00 PM on 05/30/2018       ITCH/RASH